# Patient Record
Sex: FEMALE | Race: WHITE | Employment: FULL TIME | URBAN - METROPOLITAN AREA
[De-identification: names, ages, dates, MRNs, and addresses within clinical notes are randomized per-mention and may not be internally consistent; named-entity substitution may affect disease eponyms.]

---

## 2018-01-16 PROBLEM — M54.9 BACK PAIN: Status: ACTIVE | Noted: 2018-01-16

## 2018-05-02 ENCOUNTER — OFFICE VISIT (OUTPATIENT)
Dept: OBGYN CLINIC | Age: 38
End: 2018-05-02
Payer: COMMERCIAL

## 2018-05-02 VITALS
WEIGHT: 189 LBS | BODY MASS INDEX: 30.37 KG/M2 | HEART RATE: 120 BPM | DIASTOLIC BLOOD PRESSURE: 78 MMHG | HEIGHT: 66 IN | SYSTOLIC BLOOD PRESSURE: 114 MMHG

## 2018-05-02 DIAGNOSIS — N80.9 ENDOMETRIOSIS: ICD-10-CM

## 2018-05-02 DIAGNOSIS — R10.2 PELVIC PAIN IN FEMALE: Primary | ICD-10-CM

## 2018-05-02 PROCEDURE — 99204 OFFICE O/P NEW MOD 45 MIN: CPT | Performed by: OBSTETRICS & GYNECOLOGY

## 2018-05-02 RX ORDER — OXYCODONE HYDROCHLORIDE AND ACETAMINOPHEN 5; 325 MG/1; MG/1
TABLET ORAL
Refills: 0 | COMMUNITY
Start: 2018-04-25 | End: 2022-06-01 | Stop reason: ALTCHOICE

## 2018-05-09 PROBLEM — N80.9 ENDOMETRIOSIS: Status: ACTIVE | Noted: 2018-05-09

## 2018-05-09 ASSESSMENT — ENCOUNTER SYMPTOMS
SHORTNESS OF BREATH: 0
VOMITING: 0
NAUSEA: 0
COUGH: 0

## 2018-05-11 ENCOUNTER — TELEPHONE (OUTPATIENT)
Dept: OBGYN CLINIC | Age: 38
End: 2018-05-11

## 2022-06-01 ENCOUNTER — HOSPITAL ENCOUNTER (OUTPATIENT)
Age: 42
Discharge: HOME | End: 2022-06-01
Payer: COMMERCIAL

## 2022-06-01 ENCOUNTER — OFFICE VISIT (OUTPATIENT)
Dept: OBGYN | Age: 42
End: 2022-06-01
Payer: COMMERCIAL

## 2022-06-01 VITALS
WEIGHT: 247 LBS | DIASTOLIC BLOOD PRESSURE: 80 MMHG | SYSTOLIC BLOOD PRESSURE: 136 MMHG | BODY MASS INDEX: 39.7 KG/M2 | HEIGHT: 66 IN

## 2022-06-01 DIAGNOSIS — G89.4 PAIN SYNDROME, CHRONIC: ICD-10-CM

## 2022-06-01 DIAGNOSIS — R63.5 WEIGHT GAIN: ICD-10-CM

## 2022-06-01 DIAGNOSIS — Z78.0 MENOPAUSE: Primary | ICD-10-CM

## 2022-06-01 LAB
ABSOLUTE EOS #: 0.15 K/UL (ref 0–0.44)
ABSOLUTE IMMATURE GRANULOCYTE: <0.03 K/UL (ref 0–0.3)
ABSOLUTE LYMPH #: 2.55 K/UL (ref 1.1–3.7)
ABSOLUTE MONO #: 0.69 K/UL (ref 0.1–1.2)
BASOPHILS # BLD: 0 % (ref 0–2)
BASOPHILS ABSOLUTE: 0.03 K/UL (ref 0–0.2)
EOSINOPHILS RELATIVE PERCENT: 2 % (ref 1–4)
HCT VFR BLD CALC: 45.8 % (ref 36.3–47.1)
HEMOGLOBIN: 14.4 G/DL (ref 11.9–15.1)
IMMATURE GRANULOCYTES: 0 %
LYMPHOCYTES # BLD: 26 % (ref 24–43)
MCH RBC QN AUTO: 30.1 PG (ref 25.2–33.5)
MCHC RBC AUTO-ENTMCNC: 31.4 G/DL (ref 28.4–34.8)
MCV RBC AUTO: 95.6 FL (ref 82.6–102.9)
MONOCYTES # BLD: 7 % (ref 3–12)
NRBC AUTOMATED: 0 PER 100 WBC
PDW BLD-RTO: 12.3 % (ref 11.8–14.4)
PLATELET # BLD: 277 K/UL (ref 138–453)
PMV BLD AUTO: 9.7 FL (ref 8.1–13.5)
RBC # BLD: 4.79 M/UL (ref 3.95–5.11)
SEG NEUTROPHILS: 65 % (ref 36–65)
SEGMENTED NEUTROPHILS ABSOLUTE COUNT: 6.57 K/UL (ref 1.5–8.1)
THYROXINE, FREE: 1.13 NG/DL (ref 0.93–1.7)
TSH SERPL DL<=0.05 MIU/L-ACNC: 3.9 UIU/ML (ref 0.3–5)
WBC # BLD: 10 K/UL (ref 3.5–11.3)

## 2022-06-01 PROCEDURE — 84443 ASSAY THYROID STIM HORMONE: CPT

## 2022-06-01 PROCEDURE — 99202 OFFICE O/P NEW SF 15 MIN: CPT | Performed by: ADVANCED PRACTICE MIDWIFE

## 2022-06-01 PROCEDURE — 85025 COMPLETE CBC W/AUTO DIFF WBC: CPT

## 2022-06-01 PROCEDURE — 36415 COLL VENOUS BLD VENIPUNCTURE: CPT

## 2022-06-01 PROCEDURE — 86665 EPSTEIN-BARR CAPSID VCA: CPT

## 2022-06-01 PROCEDURE — 86663 EPSTEIN-BARR ANTIBODY: CPT

## 2022-06-01 PROCEDURE — 84439 ASSAY OF FREE THYROXINE: CPT

## 2022-06-01 PROCEDURE — 86376 MICROSOMAL ANTIBODY EACH: CPT

## 2022-06-01 PROCEDURE — 86664 EPSTEIN-BARR NUCLEAR ANTIGEN: CPT

## 2022-06-01 RX ORDER — ALPRAZOLAM 0.5 MG/1
0.5 TABLET ORAL EVERY 8 HOURS PRN
COMMUNITY
Start: 2018-11-14

## 2022-06-01 RX ORDER — PANTOPRAZOLE SODIUM 40 MG/1
TABLET, DELAYED RELEASE ORAL
COMMUNITY
Start: 2022-05-27

## 2022-06-01 NOTE — PROGRESS NOTES
PROBLEM VISIT     Date of service: 2022    Elaine Lovett  Is a 42 y.o.  female    PT's PCP is: Judi Monterroso MD     : 1980                                             Subjective:       No LMP recorded. Patient has had a hysterectomy. OB History    Para Term  AB Living   1 1 1     1   SAB IAB Ectopic Molar Multiple Live Births             1      # Outcome Date GA Lbr Elijah/2nd Weight Sex Delivery Anes PTL Lv   1 Term 06 40w0d   F CS-LTranv   ANNA        Social History     Tobacco Use   Smoking Status Former Smoker   Smokeless Tobacco Never Used        Social History     Substance and Sexual Activity   Alcohol Use Not Currently       Social History     Substance and Sexual Activity   Sexual Activity Yes    Partners: Male       Allergies: Latex, Estrogens, Amoxicillin, Cephalosporins, Clindamycin/lincomycin, Penicillins, and Sulfa antibiotics    Chief Complaint   Patient presents with    Menopause     Patient had hysterectomy at approx. age 26 for chronic history of endometriosis, patient then had ovaries removed at 31-29 years old. Patient was on various medicatiions, Effexor , Estradiol  however is no longer taking . Patient has hot flashes, night sweats  sleeps poorly, weight gain and very montes. Last Yearly:  unknown    Last pap: unknown    Last HPV: unknown    Have you had a positive covid test: Yes    Have you had the covid immunization: No    PE:  Vital Signs  Blood pressure 136/80, height 5' 6\" (1.676 m), weight 247 lb (112 kg), not currently breastfeeding. Estimated body mass index is 39.87 kg/m² as calculated from the following:    Height as of this encounter: 5' 6\" (1.676 m). Weight as of this encounter: 247 lb (112 kg).     No data recorded      NURSE: Jesenia DORMAN    HPI: patient has many symptoms that are directly related to her surgical menopause, although she reports poor results with ERT and with effexor trial.  \"dont like to just have  A pill thrown at me\" with breast biopsy there are notes that Dr. Shantell Zamorano does not want patient on estrogen. Also feels discomfort intermittantly \"everywhere\" and at times cant feel anything at all \"like in my arms and legs\"    PT denies fever, chills, nausea and vomiting       Objective: No acute distress  Excellent communications  Well-nourished    Results reviewed today:    No results found for this visit on 06/01/22. Assessment and Plan          Diagnosis Orders   1. Menopause     2. Weight gain  TSH    T4, Free    Thyroid Peroxidase Antibody   3. Pain syndrome, chronic  CBC with Auto Differential    Angie Barr Virus (EBV) Antibody Panel I       consider referral to Dr Marc Marrero      I have discontinued Fran Valenzuela's diazePAM and oxyCODONE-acetaminophen. I am also having her maintain her ALPRAZolam, pantoprazole, and NONFORMULARY. Return for 7-10 days review labs. There are no Patient Instructions on file for this visit.     Time spent 20 minutes      Boo Fernández, [address] [facility] [place] Sharp 11:05 PM

## 2022-06-02 LAB — THYROID PEROXIDASE (TPO) AB: 7.3 IU/ML (ref 0–25)

## 2022-06-06 LAB
EBV EARLY ANTIGEN IGG: 111 U/ML
EBV INTERPRETATION: ABNORMAL
EBV NUCLEAR AG AB: 414 U/ML
EPSTEIN-BARR VCA IGG: 1332 U/ML
EPSTEIN-BARR VCA IGM: 155 U/ML

## 2022-06-07 DIAGNOSIS — R53.82 CHRONIC FATIGUE: Primary | ICD-10-CM

## 2022-06-14 ENCOUNTER — OFFICE VISIT (OUTPATIENT)
Dept: OBGYN | Age: 42
End: 2022-06-14
Payer: COMMERCIAL

## 2022-06-14 VITALS
SYSTOLIC BLOOD PRESSURE: 116 MMHG | WEIGHT: 247 LBS | BODY MASS INDEX: 39.7 KG/M2 | DIASTOLIC BLOOD PRESSURE: 74 MMHG | HEIGHT: 66 IN

## 2022-06-14 DIAGNOSIS — R63.5 WEIGHT GAIN: ICD-10-CM

## 2022-06-14 DIAGNOSIS — R53.82 CHRONIC FATIGUE: Primary | ICD-10-CM

## 2022-06-14 DIAGNOSIS — G89.4 PAIN SYNDROME, CHRONIC: ICD-10-CM

## 2022-06-14 DIAGNOSIS — Z78.0 MENOPAUSE: ICD-10-CM

## 2022-06-14 PROCEDURE — 99213 OFFICE O/P EST LOW 20 MIN: CPT | Performed by: ADVANCED PRACTICE MIDWIFE

## 2022-06-14 RX ORDER — SPIRONOLACTONE 25 MG/1
TABLET ORAL
COMMUNITY
Start: 2022-04-29

## 2022-06-14 NOTE — PROGRESS NOTES
PROBLEM VISIT     Date of service: 2022    Elaine Lovett  Is a 42 y.o.  female    PT's PCP is: Judi Monterroso MD     : 1980                                             Subjective:       No LMP recorded. Patient has had a hysterectomy. OB History    Para Term  AB Living   1 1 1     1   SAB IAB Ectopic Molar Multiple Live Births             1      # Outcome Date GA Lbr Elijah/2nd Weight Sex Delivery Anes PTL Lv   1 Term 06 40w0d   F CS-LTranv   ANNA        Social History     Tobacco Use   Smoking Status Former Smoker   Smokeless Tobacco Never Used        Social History     Substance and Sexual Activity   Alcohol Use Not Currently       Social History     Substance and Sexual Activity   Sexual Activity Yes    Partners: Male       Allergies: Latex, Estrogens, Amoxicillin, Cephalosporins, Clindamycin/lincomycin, Penicillins, and Sulfa antibiotics    Chief Complaint   Patient presents with    Menopause     Follow up labs, h/o menopausal symptoms. Patient has been referred to Dr. Harsh Costello        Last Yearly:  Approx. 10 years    Last pap: Approx. 10 years    Last HPV: unknown    Have you had a positive covid test: Yes    Have you had the covid immunization: No    PE:  Vital Signs  Blood pressure 116/74, height 5' 6\" (1.676 m), weight 247 lb (112 kg), not currently breastfeeding. Estimated body mass index is 39.87 kg/m² as calculated from the following:    Height as of this encounter: 5' 6\" (1.676 m). Weight as of this encounter: 247 lb (112 kg). No data recorded      NURSE: Ash DORMAN    HPI: patient had symptoms consistent with menopause but had poor response to hrt and effexor, breast surgeon also requests no further estrogen. Labs with overall elevated olman barr virus antibodies.       PT denies fever, chills, nausea and vomiting       Objective: No acute distress  Excellent communications  Well-nourished    Results reviewed today:    No results found for this visit